# Patient Record
Sex: FEMALE | Race: BLACK OR AFRICAN AMERICAN | NOT HISPANIC OR LATINO | URBAN - METROPOLITAN AREA
[De-identification: names, ages, dates, MRNs, and addresses within clinical notes are randomized per-mention and may not be internally consistent; named-entity substitution may affect disease eponyms.]

---

## 2020-07-25 ENCOUNTER — TELEPHONE ENCOUNTER (OUTPATIENT)
Dept: URBAN - METROPOLITAN AREA CLINIC 13 | Facility: CLINIC | Age: 60
End: 2020-07-25

## 2020-07-25 RX ORDER — POLYETHYLENE GLYCOL 3350, SODIUM CHLORIDE, SODIUM BICARBONATE AND POTASSIUM CHLORIDE WITH LEMON FLAVOR 420; 11.2; 5.72; 1.48 G/4L; G/4L; G/4L; G/4L
TAKE 1/2 GALLON AT 5:00 PM DAY BEFORE PROCEDURE, TAKE SECOND 1/2 OF GALLON 6 HRS PRIOR TO PROCEDURE POWDER, FOR SOLUTION ORAL
Qty: 1 | Refills: 0 | OUTPATIENT
Start: 2018-04-04 | End: 2018-06-08

## 2020-07-26 ENCOUNTER — TELEPHONE ENCOUNTER (OUTPATIENT)
Dept: URBAN - METROPOLITAN AREA CLINIC 13 | Facility: CLINIC | Age: 60
End: 2020-07-26

## 2020-07-26 RX ORDER — QUINAPRIL AND HYDROCHLOROTHIAZIDE 12.5; 1 MG/1; MG/1
TAKE 1 TABLET ONCE DAILY TABLET, FILM COATED ORAL
Refills: 0 | Status: ACTIVE | COMMUNITY
Start: 2018-04-04

## 2020-07-26 RX ORDER — AMLODIPINE BESYLATE 10 MG/1
TAKE 1 TABLET DAILY TABLET ORAL
Refills: 0 | Status: ACTIVE | COMMUNITY
Start: 2018-04-04

## 2020-07-26 RX ORDER — ASPIRIN 81 MG/1
TAKE 1 TABLET DAILY TABLET ORAL
Refills: 0 | Status: ACTIVE | COMMUNITY
Start: 2018-04-04

## 2020-07-26 RX ORDER — INSULIN ASPART 100 [IU]/ML
INJECT 10 UNIT TWICE DAILY INJECTION, SUSPENSION SUBCUTANEOUS
Refills: 0 | Status: ACTIVE | COMMUNITY
Start: 2018-04-04

## 2020-07-26 RX ORDER — METFORMIN HYDROCHLORIDE 1000 MG/1
TAKE 1 TABLET TWICE DAILY WITH MEALS TABLET, COATED ORAL
Refills: 0 | Status: ACTIVE | COMMUNITY
Start: 2018-04-04

## 2020-07-26 RX ORDER — LOSARTAN POTASSIUM 100 MG/1
TAKE 1 TABLET DAILY TABLET, FILM COATED ORAL
Refills: 0 | Status: ACTIVE | COMMUNITY
Start: 2018-04-04

## 2020-07-26 RX ORDER — GLIPIZIDE 10 MG/1
TAKE 1 TABLET DAILY TABLET ORAL
Refills: 0 | Status: ACTIVE | COMMUNITY
Start: 2018-04-04

## 2020-07-26 RX ORDER — HYDROCHLOROTHIAZIDE 25 MG/1
TAKE 1 TABLET DAILY TABLET ORAL
Refills: 0 | Status: ACTIVE | COMMUNITY
Start: 2018-04-04

## 2020-07-26 RX ORDER — CETIRIZINE HYDROCHLORIDE 10 MG/1
TAKE 1 TABLET DAILY AS NEEDED TABLET, FILM COATED ORAL
Refills: 0 | Status: ACTIVE | COMMUNITY
Start: 2018-04-04

## 2020-07-26 RX ORDER — FLUTICASONE PROPIONATE 50 UG/1
USE 1 SPRAY IN EACH NOSTRIL ONCE DAILY SPRAY, METERED NASAL
Refills: 0 | Status: ACTIVE | COMMUNITY
Start: 2018-04-04

## 2020-12-12 ENCOUNTER — TELEPHONE ENCOUNTER (OUTPATIENT)
Dept: URBAN - METROPOLITAN AREA CLINIC 113 | Facility: CLINIC | Age: 60
End: 2020-12-12

## 2020-12-13 ENCOUNTER — CLAIMS CREATED FROM THE CLAIM WINDOW (OUTPATIENT)
Dept: URBAN - METROPOLITAN AREA MEDICAL CENTER 43 | Facility: MEDICAL CENTER | Age: 60
End: 2020-12-13
Payer: COMMERCIAL

## 2020-12-13 DIAGNOSIS — K92.1 MELENA: ICD-10-CM

## 2020-12-13 DIAGNOSIS — D62 ACUTE POSTHEMORRHAGIC ANEMIA: ICD-10-CM

## 2020-12-13 PROCEDURE — 99254 IP/OBS CNSLTJ NEW/EST MOD 60: CPT | Performed by: INTERNAL MEDICINE

## 2020-12-14 ENCOUNTER — CLAIMS CREATED FROM THE CLAIM WINDOW (OUTPATIENT)
Dept: URBAN - METROPOLITAN AREA MEDICAL CENTER 43 | Facility: MEDICAL CENTER | Age: 60
End: 2020-12-14
Payer: COMMERCIAL

## 2020-12-14 DIAGNOSIS — D62 ACUTE POSTHEMORRHAGIC ANEMIA: ICD-10-CM

## 2020-12-14 DIAGNOSIS — K25.9 GASTRIC ULCER, UNSPECIFIED AS ACUTE OR CHRONIC, WITHOUT HEMORRHAGE OR PERFORATION: ICD-10-CM

## 2020-12-14 DIAGNOSIS — K92.1 MELENA: ICD-10-CM

## 2020-12-14 PROCEDURE — 43239 EGD BIOPSY SINGLE/MULTIPLE: CPT | Performed by: INTERNAL MEDICINE

## 2020-12-15 ENCOUNTER — CLAIMS CREATED FROM THE CLAIM WINDOW (OUTPATIENT)
Dept: URBAN - METROPOLITAN AREA MEDICAL CENTER 43 | Facility: MEDICAL CENTER | Age: 60
End: 2020-12-15
Payer: COMMERCIAL

## 2020-12-15 DIAGNOSIS — K92.1 MELENA: ICD-10-CM

## 2020-12-15 DIAGNOSIS — K25.9 GASTRIC ULCER, UNSPECIFIED AS ACUTE OR CHRONIC, WITHOUT HEMORRHAGE OR PERFORATION: ICD-10-CM

## 2020-12-15 DIAGNOSIS — D62 ACUTE POSTHEMORRHAGIC ANEMIA: ICD-10-CM

## 2020-12-15 PROCEDURE — 99232 SBSQ HOSP IP/OBS MODERATE 35: CPT | Performed by: INTERNAL MEDICINE

## 2020-12-18 PROBLEM — 397825006 GASTRIC ULCER: Status: ACTIVE | Noted: 2020-12-18

## 2020-12-21 ENCOUNTER — OFFICE VISIT (OUTPATIENT)
Dept: URBAN - METROPOLITAN AREA CLINIC 113 | Facility: CLINIC | Age: 60
End: 2020-12-21

## 2020-12-21 RX ORDER — FLUTICASONE PROPIONATE 50 UG/1
USE 1 SPRAY IN EACH NOSTRIL ONCE DAILY SPRAY, METERED NASAL
Refills: 0 | Status: ACTIVE | COMMUNITY
Start: 2018-04-04

## 2020-12-21 RX ORDER — QUINAPRIL AND HYDROCHLOROTHIAZIDE 12.5; 1 MG/1; MG/1
TAKE 1 TABLET ONCE DAILY TABLET, FILM COATED ORAL
Refills: 0 | Status: ACTIVE | COMMUNITY
Start: 2018-04-04

## 2020-12-21 RX ORDER — METFORMIN HYDROCHLORIDE 1000 MG/1
TAKE 1 TABLET TWICE DAILY WITH MEALS TABLET, COATED ORAL
Refills: 0 | Status: ACTIVE | COMMUNITY
Start: 2018-04-04

## 2020-12-21 RX ORDER — LOSARTAN POTASSIUM 100 MG/1
TAKE 1 TABLET DAILY TABLET, FILM COATED ORAL
Refills: 0 | Status: ACTIVE | COMMUNITY
Start: 2018-04-04

## 2020-12-21 RX ORDER — CETIRIZINE HYDROCHLORIDE 10 MG/1
TAKE 1 TABLET DAILY AS NEEDED TABLET, FILM COATED ORAL
Refills: 0 | Status: ACTIVE | COMMUNITY
Start: 2018-04-04

## 2020-12-21 RX ORDER — HYDROCHLOROTHIAZIDE 25 MG/1
TAKE 1 TABLET DAILY TABLET ORAL
Refills: 0 | Status: ACTIVE | COMMUNITY
Start: 2018-04-04

## 2020-12-21 RX ORDER — AMLODIPINE BESYLATE 10 MG/1
TAKE 1 TABLET DAILY TABLET ORAL
Refills: 0 | Status: ACTIVE | COMMUNITY
Start: 2018-04-04

## 2020-12-21 RX ORDER — ASPIRIN 81 MG/1
TAKE 1 TABLET DAILY TABLET ORAL
Refills: 0 | Status: ACTIVE | COMMUNITY
Start: 2018-04-04

## 2020-12-21 RX ORDER — INSULIN ASPART 100 [IU]/ML
INJECT 10 UNIT TWICE DAILY INJECTION, SUSPENSION SUBCUTANEOUS
Refills: 0 | Status: ACTIVE | COMMUNITY
Start: 2018-04-04

## 2020-12-21 RX ORDER — GLIPIZIDE 10 MG/1
TAKE 1 TABLET DAILY TABLET ORAL
Refills: 0 | Status: ACTIVE | COMMUNITY
Start: 2018-04-04

## 2020-12-21 NOTE — HPI-TODAY'S VISIT:
Ms. Is a 60-year-old female presenting for follow-up after hospitalization. She was seen on 12/13/2020 at [facility] by Dr. Lua regarding acute blood loss anemia and guaiac positive stools.  EGD was performed on 12/14/20 and was notable for a nonbleeding cratered gastric ulcer in the gastric antrum measuring 9 mm in diameter, chronic gastritis, gastric erosions.  Gastric biopsies negative for H. pylori.

## 2021-01-06 ENCOUNTER — TELEPHONE ENCOUNTER (OUTPATIENT)
Dept: URBAN - METROPOLITAN AREA CLINIC 113 | Facility: CLINIC | Age: 61
End: 2021-01-06

## 2021-01-14 ENCOUNTER — LAB OUTSIDE AN ENCOUNTER (OUTPATIENT)
Dept: URBAN - METROPOLITAN AREA CLINIC 113 | Facility: CLINIC | Age: 61
End: 2021-01-14

## 2021-01-14 ENCOUNTER — OFFICE VISIT (OUTPATIENT)
Dept: URBAN - METROPOLITAN AREA CLINIC 113 | Facility: CLINIC | Age: 61
End: 2021-01-14
Payer: COMMERCIAL

## 2021-01-14 VITALS
WEIGHT: 175 LBS | SYSTOLIC BLOOD PRESSURE: 158 MMHG | HEART RATE: 84 BPM | TEMPERATURE: 97.1 F | HEIGHT: 62 IN | DIASTOLIC BLOOD PRESSURE: 81 MMHG | BODY MASS INDEX: 32.2 KG/M2

## 2021-01-14 DIAGNOSIS — K92.1 MELENA: ICD-10-CM

## 2021-01-14 DIAGNOSIS — D62 ACUTE POSTHEMORRHAGIC ANEMIA: ICD-10-CM

## 2021-01-14 DIAGNOSIS — K25.3 GASTRIC ULCER: ICD-10-CM

## 2021-01-14 DIAGNOSIS — K25.9 GASTRIC ULCER, UNSPECIFIED AS ACUTE OR CHRONIC, WITHOUT HEMORRHAGE OR PERFORATION: ICD-10-CM

## 2021-01-14 PROCEDURE — G8427 DOCREV CUR MEDS BY ELIG CLIN: HCPCS | Performed by: NURSE PRACTITIONER

## 2021-01-14 PROCEDURE — 99214 OFFICE O/P EST MOD 30 MIN: CPT | Performed by: NURSE PRACTITIONER

## 2021-01-14 RX ORDER — INSULIN ASPART 100 [IU]/ML
INJECT 10 UNIT TWICE DAILY INJECTION, SUSPENSION SUBCUTANEOUS
Refills: 0 | Status: ACTIVE | COMMUNITY
Start: 2018-04-04

## 2021-01-14 RX ORDER — HYDROCHLOROTHIAZIDE 25 MG/1
TAKE 1 TABLET DAILY TABLET ORAL
Refills: 0 | Status: ACTIVE | COMMUNITY
Start: 2018-04-04

## 2021-01-14 RX ORDER — GLIPIZIDE 10 MG/1
TAKE 1 TABLET DAILY TABLET ORAL
Refills: 0 | Status: ON HOLD | COMMUNITY
Start: 2018-04-04

## 2021-01-14 RX ORDER — CETIRIZINE HYDROCHLORIDE 10 MG/1
TAKE 1 TABLET DAILY AS NEEDED TABLET, FILM COATED ORAL
Refills: 0 | Status: ON HOLD | COMMUNITY
Start: 2018-04-04

## 2021-01-14 RX ORDER — LOSARTAN POTASSIUM 100 MG/1
TAKE 1 TABLET DAILY TABLET, FILM COATED ORAL
Refills: 0 | Status: ON HOLD | COMMUNITY
Start: 2018-04-04

## 2021-01-14 RX ORDER — ASPIRIN 81 MG/1
TAKE 1 TABLET DAILY TABLET ORAL
Refills: 0 | Status: ACTIVE | COMMUNITY
Start: 2018-04-04

## 2021-01-14 RX ORDER — VITAMIN A 2400 MCG
1 TABLET CAPSULE ORAL ONCE A DAY
Qty: 30 | OUTPATIENT
Start: 2021-01-14

## 2021-01-14 RX ORDER — AMLODIPINE BESYLATE 10 MG/1
TAKE 1 TABLET DAILY TABLET ORAL
Refills: 0 | Status: ACTIVE | COMMUNITY
Start: 2018-04-04

## 2021-01-14 RX ORDER — FLUTICASONE PROPIONATE 50 UG/1
USE 1 SPRAY IN EACH NOSTRIL ONCE DAILY SPRAY, METERED NASAL
Refills: 0 | Status: ACTIVE | COMMUNITY
Start: 2018-04-04

## 2021-01-14 RX ORDER — PANTOPRAZOLE SODIUM 40 MG/1
1 TABLET TABLET, DELAYED RELEASE ORAL TWICE DAILY
Qty: 180 | Refills: 4 | OUTPATIENT
Start: 2021-01-14

## 2021-01-14 RX ORDER — LOSARTAN POTASSIUM 50 MG/1
1 TABLET TABLET ORAL ONCE A DAY
Status: ACTIVE | COMMUNITY

## 2021-01-14 RX ORDER — QUINAPRIL AND HYDROCHLOROTHIAZIDE 12.5; 1 MG/1; MG/1
TAKE 1 TABLET ONCE DAILY TABLET, FILM COATED ORAL
Refills: 0 | Status: ON HOLD | COMMUNITY
Start: 2018-04-04

## 2021-01-14 RX ORDER — METFORMIN HYDROCHLORIDE 1000 MG/1
TAKE 1 TABLET TWICE DAILY WITH MEALS TABLET, COATED ORAL
Refills: 0 | Status: ACTIVE | COMMUNITY
Start: 2018-04-04

## 2021-01-14 NOTE — HPI-TODAY'S VISIT:
60-year-old woman, patient of Dr. Art, presenting for follow-up after hospitalization for upper GI bleeding, likely due to nonsteroidal induced injury.  She was seen in consultation by Dr. Adalberto Lua on 12/13/2020.  Hemoglobin on admission was 5.5, hematocrit 17.5, platelets 333.  She was transfused 2 units of  packed red blood cells with hemoglobin increasing to 8.8  Following transfusion.  Her baseline hemoglobin is approximately around 9.  An EGD was performed 12/14/2020.  Findings included a normal esophagus, nonbleeding gastric ulcer without stigmata of bleeding, chronic gastritis status post biopsies, gastric erosions without bleeding, normal duodenum.  Pathology demonstrated no H. pylori.  She was discharged on  12/15/2020 on pantoprazole 40 mg twice daily, ferrous sulfate 324 mg  twice daily.  Repeat EGD is recommended in 8 weeks to ensure ulcer healing.  She called the office stating that she was experiencing diarrhea. She was instructed to stop Cipro, and planned for stools for C. diff. This was negative. She has been taking PPI BID. No NSAIDs.  Patient is without any abdominal complaints today. No dysphagia, heartburn, regurgitation, unintentional weight loss, nausea, vomiting, hematemesis or melena. Bowels are moving regularly without blood per rectum. No complaints of bloating. No jaundice, icterus.

## 2021-02-09 ENCOUNTER — TELEPHONE ENCOUNTER (OUTPATIENT)
Dept: URBAN - METROPOLITAN AREA CLINIC 113 | Facility: CLINIC | Age: 61
End: 2021-02-09

## 2021-02-09 RX ORDER — RABEPRAZOLE SODIUM 20 MG/1
1 TABLET TABLET, DELAYED RELEASE ORAL ONCE A DAY
Qty: 30 TABLET | Refills: 6 | OUTPATIENT
Start: 2021-02-09

## 2021-02-19 ENCOUNTER — OFFICE VISIT (OUTPATIENT)
Dept: URBAN - METROPOLITAN AREA SURGERY CENTER 25 | Facility: SURGERY CENTER | Age: 61
End: 2021-02-19

## 2021-04-19 ENCOUNTER — OFFICE VISIT (OUTPATIENT)
Dept: URBAN - METROPOLITAN AREA CLINIC 113 | Facility: CLINIC | Age: 61
End: 2021-04-19

## 2021-10-18 ENCOUNTER — ERX REFILL RESPONSE (OUTPATIENT)
Dept: URBAN - METROPOLITAN AREA CLINIC 113 | Facility: CLINIC | Age: 61
End: 2021-10-18

## 2021-10-18 RX ORDER — FERROUS SULFATE TAB 325 MG (65 MG ELEMENTAL FE) 325 (65 FE) MG
TAKE ONE TABLET BY MOUTH DAILY TAB ORAL
Qty: 30 TABLET | Refills: 1 | OUTPATIENT

## 2021-10-18 RX ORDER — FERROUS SULFATE TAB 325 MG (65 MG ELEMENTAL FE) 325 (65 FE) MG
TAKE ONE TABLET BY MOUTH DAILY TAB ORAL
Qty: 30 TABLET | Refills: 0 | OUTPATIENT

## 2023-04-05 ENCOUNTER — OFFICE VISIT (OUTPATIENT)
Dept: URBAN - METROPOLITAN AREA CLINIC 113 | Facility: CLINIC | Age: 63
End: 2023-04-05
Payer: COMMERCIAL

## 2023-04-05 VITALS
HEART RATE: 69 BPM | RESPIRATION RATE: 18 BRPM | TEMPERATURE: 97.3 F | BODY MASS INDEX: 30.59 KG/M2 | HEIGHT: 62 IN | DIASTOLIC BLOOD PRESSURE: 60 MMHG | WEIGHT: 166.2 LBS | SYSTOLIC BLOOD PRESSURE: 126 MMHG

## 2023-04-05 DIAGNOSIS — K25.9 GASTRIC ULCER, UNSPECIFIED AS ACUTE OR CHRONIC, WITHOUT HEMORRHAGE OR PERFORATION: ICD-10-CM

## 2023-04-05 DIAGNOSIS — D62 ACUTE POSTHEMORRHAGIC ANEMIA: ICD-10-CM

## 2023-04-05 DIAGNOSIS — K58.0 IRRITABLE BOWEL SYNDROME WITH DIARRHEA: ICD-10-CM

## 2023-04-05 DIAGNOSIS — K92.1 MELENA: ICD-10-CM

## 2023-04-05 PROBLEM — 197125005: Status: ACTIVE | Noted: 2023-04-05

## 2023-04-05 PROCEDURE — 99244 OFF/OP CNSLTJ NEW/EST MOD 40: CPT | Performed by: INTERNAL MEDICINE

## 2023-04-05 PROCEDURE — 99204 OFFICE O/P NEW MOD 45 MIN: CPT | Performed by: INTERNAL MEDICINE

## 2023-04-05 RX ORDER — HYDROCHLOROTHIAZIDE 25 MG/1
TAKE 1 TABLET DAILY TABLET ORAL
Refills: 0 | Status: ACTIVE | COMMUNITY
Start: 2018-04-04

## 2023-04-05 RX ORDER — RIFAXIMIN 550 MG/1
1 TABLET TABLET ORAL THREE TIMES A DAY
Qty: 42 TABLET | Refills: 0 | OUTPATIENT
Start: 2023-04-05 | End: 2023-04-19

## 2023-04-05 RX ORDER — RABEPRAZOLE SODIUM 20 MG/1
1 TABLET TABLET, DELAYED RELEASE ORAL ONCE A DAY
Qty: 30 TABLET | Refills: 6 | Status: ACTIVE | COMMUNITY
Start: 2021-02-09

## 2023-04-05 RX ORDER — FLUTICASONE PROPIONATE 50 UG/1
USE 1 SPRAY IN EACH NOSTRIL ONCE DAILY SPRAY, METERED NASAL
Refills: 0 | Status: ACTIVE | COMMUNITY
Start: 2018-04-04

## 2023-04-05 RX ORDER — ASPIRIN 81 MG/1
TAKE 1 TABLET DAILY TABLET ORAL
Refills: 0 | Status: ON HOLD | COMMUNITY
Start: 2018-04-04

## 2023-04-05 RX ORDER — RABEPRAZOLE SODIUM 20 MG/1
1 TABLET TABLET, DELAYED RELEASE ORAL ONCE A DAY
Qty: 90 | Refills: 3 | OUTPATIENT
Start: 2023-04-05

## 2023-04-05 RX ORDER — METFORMIN HYDROCHLORIDE 1000 MG/1
TAKE 1 TABLET TWICE DAILY WITH MEALS TABLET, COATED ORAL
Refills: 0 | Status: ON HOLD | COMMUNITY
Start: 2018-04-04

## 2023-04-05 RX ORDER — CETIRIZINE HYDROCHLORIDE 10 MG/1
TAKE 1 TABLET DAILY AS NEEDED TABLET, FILM COATED ORAL
Refills: 0 | Status: ON HOLD | COMMUNITY
Start: 2018-04-04

## 2023-04-05 RX ORDER — INSULIN ASPART 100 [IU]/ML
INJECT 10 UNIT TWICE DAILY INJECTION, SUSPENSION SUBCUTANEOUS
Refills: 0 | Status: ACTIVE | COMMUNITY
Start: 2018-04-04

## 2023-04-05 RX ORDER — PANTOPRAZOLE SODIUM 40 MG/1
1 TABLET TABLET, DELAYED RELEASE ORAL TWICE DAILY
Qty: 180 | Refills: 4 | Status: ON HOLD | COMMUNITY
Start: 2021-01-14

## 2023-04-05 RX ORDER — QUINAPRIL AND HYDROCHLOROTHIAZIDE 12.5; 1 MG/1; MG/1
TAKE 1 TABLET ONCE DAILY TABLET, FILM COATED ORAL
Refills: 0 | Status: ON HOLD | COMMUNITY
Start: 2018-04-04

## 2023-04-05 RX ORDER — AMLODIPINE BESYLATE 10 MG/1
TAKE 1 TABLET DAILY TABLET ORAL
Refills: 0 | Status: ACTIVE | COMMUNITY
Start: 2018-04-04

## 2023-04-05 RX ORDER — LOSARTAN POTASSIUM 50 MG/1
1 TABLET TABLET ORAL ONCE A DAY
Status: ACTIVE | COMMUNITY

## 2023-04-05 RX ORDER — LOSARTAN POTASSIUM 100 MG/1
TAKE 1 TABLET DAILY TABLET, FILM COATED ORAL
Refills: 0 | Status: ON HOLD | COMMUNITY
Start: 2018-04-04

## 2023-04-05 RX ORDER — VITAMIN A 2400 MCG
1 TABLET CAPSULE ORAL ONCE A DAY
Qty: 30 | Status: ACTIVE | COMMUNITY
Start: 2021-01-14

## 2023-04-05 RX ORDER — GLIPIZIDE 10 MG/1
TAKE 1 TABLET DAILY TABLET ORAL
Refills: 0 | Status: ON HOLD | COMMUNITY
Start: 2018-04-04

## 2023-04-05 NOTE — PHYSICAL EXAM GASTROINTESTINAL
soft, nontender, nondistended , normal bowel sounds, midline bulge noted when patient sits up straight from supine position

## 2023-04-05 NOTE — HPI-TODAY'S VISIT:
60-year-old woman, patient of Dr. Art, presenting for follow-up after hospitalization for upper GI bleeding, likely due to nonsteroidal induced injury.  She was seen in consultation by Dr. Adalberto Lua on 12/13/2020.  Hemoglobin on admission was 5.5, hematocrit 17.5, platelets 333.  She was transfused 2 units of  packed red blood cells with hemoglobin increasing to 8.8  Following transfusion.  Her baseline hemoglobin is approximately around 9.  An EGD was performed 12/14/2020.  Findings included a normal esophagus, nonbleeding gastric ulcer without stigmata of bleeding, chronic gastritis status post biopsies, gastric erosions without bleeding, normal duodenum.  Pathology demonstrated no H. pylori.  She was discharged on  12/15/2020 on pantoprazole 40 mg twice daily, ferrous sulfate 324 mg  twice daily.  Repeat EGD is recommended in 8 weeks to ensure ulcer healing.  She called the office stating that she was experiencing diarrhea. She was instructed to stop Cipro, and planned for stools for C. diff. This was negative. She has been taking PPI BID. No NSAIDs.  Patient is without any abdominal complaints today. No dysphagia, heartburn, regurgitation, unintentional weight loss, nausea, vomiting, hematemesis or melena. Bowels are moving regularly without blood per rectum. No complaints of bloating. No jaundice, icterus.  Interval history, 4/5/2023 62-year-old female with a history of COPD, chronic kidney disease stage IV, type 2 diabetes, hypertension, GERD presents for long interval follow-up.  She was last seen on 1/14/2021.  She was recently hospitalized for melena and acute blood loss anemia secondary to gastric ulcer.  She was treated with 2 units packed PRBC.  She was to continue twice daily PPI therapy with plans for repeat EGD to assess for ulcer healing.  She was to continue NSAID avoidance, and we would repeat hemoglobin to trend. Pantoprazole caused severe diarrhea.  This was switched to rabeprazole.  EGD scheduled for 2/19/2021 was canceled due to COVID.  She has been lost to follow-up ever since. She has been referred back today by Dr. Hipolito Hernandez for irritable bowel syndrome with diarrhea.  A copy of today's visit will be forwarded to the referring provider. Per the referral note, she is currently using Lomotil to manage her diarrhea. Labs 11/18/2022:Hemoglobin A1c 13.1, cholesterol 200, , glucose 330, BUN 31, creatinine 1.81, GFR 32, alkaline phosphatase 125 otherwise normal LFTs, hemoglobin 11.4, hematocrit 33, normal MCV. Colonoscopy 6/8/2018:Performed with difficulty due to restricted mobility of the colon, significant looping and a tortuous colon.  Quality of bowel prep was good.  TI was normal.  Diverticulosis in the sigmoid colon.  No specimens collected.  Repeat colonoscopy in 10 years for screening purposes.  Due in June 2028.  She has had intermittent diarrhea ongoing for years. She states this flared after having Covid in 2021 then again in 2023. She was requiring adult diapers due to incontinence. She has had fairly normal bowel movements for the last 2-4 weeks. She is concerned for future flares. SHe has Lomotil pending exacerbations. She has had marked abdominal bloating for some time now. Denies abdominal pain.   She is no longer on Metformin. She is no longer taking Pantoprazole. She is currently managing her reflux with Rabeprazole. She has run out of the medication. She has occasional nausea without vomiting. Denies melena or hematemesis.

## 2023-05-17 ENCOUNTER — OFFICE VISIT (OUTPATIENT)
Dept: URBAN - METROPOLITAN AREA CLINIC 113 | Facility: CLINIC | Age: 63
End: 2023-05-17
Payer: COMMERCIAL

## 2023-05-17 ENCOUNTER — WEB ENCOUNTER (OUTPATIENT)
Dept: URBAN - METROPOLITAN AREA CLINIC 113 | Facility: CLINIC | Age: 63
End: 2023-05-17

## 2023-05-17 VITALS
HEART RATE: 75 BPM | BODY MASS INDEX: 31.36 KG/M2 | SYSTOLIC BLOOD PRESSURE: 103 MMHG | DIASTOLIC BLOOD PRESSURE: 64 MMHG | HEIGHT: 62 IN | WEIGHT: 170.4 LBS | RESPIRATION RATE: 18 BRPM | TEMPERATURE: 97.3 F

## 2023-05-17 DIAGNOSIS — K25.7 CHRONIC GASTRIC ULCER: ICD-10-CM

## 2023-05-17 DIAGNOSIS — R19.7 CHRONIC DIARRHEA: ICD-10-CM

## 2023-05-17 DIAGNOSIS — R14.0 ABDOMINAL BLOATING: ICD-10-CM

## 2023-05-17 DIAGNOSIS — D64.89 OTHER SPECIFIED ANEMIAS: ICD-10-CM

## 2023-05-17 PROCEDURE — 99214 OFFICE O/P EST MOD 30 MIN: CPT | Performed by: INTERNAL MEDICINE

## 2023-05-17 RX ORDER — FLUTICASONE PROPIONATE 50 UG/1
USE 1 SPRAY IN EACH NOSTRIL ONCE DAILY SPRAY, METERED NASAL
Refills: 0 | Status: ACTIVE | COMMUNITY
Start: 2018-04-04

## 2023-05-17 RX ORDER — CETIRIZINE HYDROCHLORIDE 10 MG/1
TAKE 1 TABLET DAILY AS NEEDED TABLET, FILM COATED ORAL
Refills: 0 | Status: ON HOLD | COMMUNITY
Start: 2018-04-04

## 2023-05-17 RX ORDER — INSULIN ASPART 100 [IU]/ML
INJECT 10 UNIT TWICE DAILY INJECTION, SUSPENSION SUBCUTANEOUS
Refills: 0 | Status: ACTIVE | COMMUNITY
Start: 2018-04-04

## 2023-05-17 RX ORDER — RABEPRAZOLE SODIUM 20 MG/1
1 TABLET TABLET, DELAYED RELEASE ORAL ONCE A DAY
Qty: 90 | Refills: 3 | Status: ACTIVE | COMMUNITY
Start: 2023-04-05

## 2023-05-17 RX ORDER — GLIPIZIDE 10 MG/1
TAKE 1 TABLET DAILY TABLET ORAL
Refills: 0 | Status: ON HOLD | COMMUNITY
Start: 2018-04-04

## 2023-05-17 RX ORDER — ASPIRIN 81 MG/1
TAKE 1 TABLET DAILY TABLET ORAL
Refills: 0 | Status: ON HOLD | COMMUNITY
Start: 2018-04-04

## 2023-05-17 RX ORDER — LOSARTAN POTASSIUM 100 MG/1
TAKE 1 TABLET DAILY TABLET, FILM COATED ORAL
Refills: 0 | Status: ON HOLD | COMMUNITY
Start: 2018-04-04

## 2023-05-17 RX ORDER — HYDROCHLOROTHIAZIDE 25 MG/1
TAKE 1 TABLET DAILY TABLET ORAL
Refills: 0 | Status: ACTIVE | COMMUNITY
Start: 2018-04-04

## 2023-05-17 RX ORDER — RABEPRAZOLE SODIUM 20 MG/1
1 TABLET TABLET, DELAYED RELEASE ORAL ONCE A DAY
Qty: 30 TABLET | Refills: 6 | Status: ACTIVE | COMMUNITY
Start: 2021-02-09

## 2023-05-17 RX ORDER — METFORMIN HYDROCHLORIDE 1000 MG/1
TAKE 1 TABLET TWICE DAILY WITH MEALS TABLET, COATED ORAL
Refills: 0 | Status: ON HOLD | COMMUNITY
Start: 2018-04-04

## 2023-05-17 RX ORDER — VITAMIN A 2400 MCG
1 TABLET CAPSULE ORAL ONCE A DAY
Qty: 30 | Status: ACTIVE | COMMUNITY
Start: 2021-01-14

## 2023-05-17 RX ORDER — QUINAPRIL AND HYDROCHLOROTHIAZIDE 12.5; 1 MG/1; MG/1
TAKE 1 TABLET ONCE DAILY TABLET, FILM COATED ORAL
Refills: 0 | Status: ON HOLD | COMMUNITY
Start: 2018-04-04

## 2023-05-17 RX ORDER — LOSARTAN POTASSIUM 50 MG/1
1 TABLET TABLET ORAL ONCE A DAY
Status: ACTIVE | COMMUNITY

## 2023-05-17 RX ORDER — PANTOPRAZOLE SODIUM 40 MG/1
1 TABLET TABLET, DELAYED RELEASE ORAL TWICE DAILY
Qty: 180 | Refills: 4 | Status: ON HOLD | COMMUNITY
Start: 2021-01-14

## 2023-05-17 RX ORDER — RABEPRAZOLE SODIUM 20 MG/1
1 TABLET TABLET, DELAYED RELEASE ORAL ONCE A DAY
Qty: 90 | Refills: 3 | OUTPATIENT

## 2023-05-17 RX ORDER — AMLODIPINE BESYLATE 10 MG/1
TAKE 1 TABLET DAILY TABLET ORAL
Refills: 0 | Status: ACTIVE | COMMUNITY
Start: 2018-04-04

## 2023-05-17 NOTE — HPI-TODAY'S VISIT:
60-year-old woman, patient of Dr. Art, presenting for follow-up after hospitalization for upper GI bleeding, likely due to nonsteroidal induced injury.  She was seen in consultation by Dr. Adalberto Lua on 12/13/2020.  Hemoglobin on admission was 5.5, hematocrit 17.5, platelets 333.  She was transfused 2 units of  packed red blood cells with hemoglobin increasing to 8.8  Following transfusion.  Her baseline hemoglobin is approximately around 9.  An EGD was performed 12/14/2020.  Findings included a normal esophagus, nonbleeding gastric ulcer without stigmata of bleeding, chronic gastritis status post biopsies, gastric erosions without bleeding, normal duodenum.  Pathology demonstrated no H. pylori.  She was discharged on  12/15/2020 on pantoprazole 40 mg twice daily, ferrous sulfate 324 mg  twice daily.  Repeat EGD is recommended in 8 weeks to ensure ulcer healing.  She called the office stating that she was experiencing diarrhea. She was instructed to stop Cipro, and planned for stools for C. diff. This was negative. She has been taking PPI BID. No NSAIDs.  Patient is without any abdominal complaints today. No dysphagia, heartburn, regurgitation, unintentional weight loss, nausea, vomiting, hematemesis or melena. Bowels are moving regularly without blood per rectum. No complaints of bloating. No jaundice, icterus.  Interval history, 4/5/2023 62-year-old female with a history of COPD, chronic kidney disease stage IV, type 2 diabetes, hypertension, GERD presents for long interval follow-up.  She was last seen on 1/14/2021.  She was recently hospitalized for melena and acute blood loss anemia secondary to gastric ulcer.  She was treated with 2 units packed PRBC.  She was to continue twice daily PPI therapy with plans for repeat EGD to assess for ulcer healing.  She was to continue NSAID avoidance, and we would repeat hemoglobin to trend. Pantoprazole caused severe diarrhea.  This was switched to rabeprazole.  EGD scheduled for 2/19/2021 was canceled due to COVID.  She has been lost to follow-up ever since. She has been referred back today by Dr. Hipolito Hernandez for irritable bowel syndrome with diarrhea.  A copy of today's visit will be forwarded to the referring provider. Per the referral note, she is currently using Lomotil to manage her diarrhea. Labs 11/18/2022:Hemoglobin A1c 13.1, cholesterol 200, , glucose 330, BUN 31, creatinine 1.81, GFR 32, alkaline phosphatase 125 otherwise normal LFTs, hemoglobin 11.4, hematocrit 33, normal MCV. Colonoscopy 6/8/2018:Performed with difficulty due to restricted mobility of the colon, significant looping and a tortuous colon.  Quality of bowel prep was good.  TI was normal.  Diverticulosis in the sigmoid colon.  No specimens collected.  Repeat colonoscopy in 10 years for screening purposes.  Due in June 2028.  She has had intermittent diarrhea ongoing for years. She states this flared after having Covid in 2021 then again in 2023. She was requiring adult diapers due to incontinence. She has had fairly normal bowel movements for the last 2-4 weeks. She is concerned for future flares. SHe has Lomotil pending exacerbations. She has had marked abdominal bloating for some time now. Denies abdominal pain.   She is no longer on Metformin. She is no longer taking Pantoprazole. She is currently managing her reflux with Rabeprazole. She has run out of the medication. She has occasional nausea without vomiting. Denies melena or hematemesis.  Interval history, 5/16/2023: 62 year old female presents for follow up. She was last seen on 4/5/2023. She was trialed on course of Xifaxan for possible IBS. If no improvement, we woul consider a colonoscopy. Due to abdominal bloating, an ultrasound was planned.   Abdominal ultrasound4/17/2023: No evidence of midline anterior abdominal wall hernia or other abnormalities.  Labs 5/10/2023: Hgb 8.2, Hct 24.7, normal MCVglucose 296.7, BUN 30, creatinine 1.79, normal LFTs, GFR 32.  Patient was brought to EMS on 5/9/2023 for hyperglycemia. Hgb A1c was 13.4. Chest x-ray reveled patchy bilateral airspace opacities most pronounced within the lower loves can be seen with pulmonary edema or multifocal  infectious.inflammatory process. Kidney ultrasound revealed no evidence for obstructive uropathy. There was debris within the urniary bladder. This is nonspecific but can be seen with infection. Patient was admitted for sepsis (?)  She states her bloating has improved. She does still have intermittent diarrhea. SHe did not finish her Xifaxan prior to be hospitalized. She also did not trial the benefiber. She states her sugars are under better control. She has changed her diet. She does admit to more cold intolerance as of late. SHe has not had her hgb checked. Denies blood per rectum.

## 2023-05-18 LAB
ABSOLUTE BASOPHILS: 19
ABSOLUTE EOSINOPHILS: 151
ABSOLUTE LYMPHOCYTES: 1972
ABSOLUTE MONOCYTES: 359
ABSOLUTE NEUTROPHILS: 3799
BASOPHILS: 0.3
EOSINOPHILS: 2.4
FERRITIN, SERUM: 213
HEMATOCRIT: 28.6
HEMOGLOBIN: 9.2
IRON BIND.CAP.(TIBC): 229
IRON SATURATION: 30
IRON: 68
LYMPHOCYTES: 31.3
MCH: 28
MCHC: 32.2
MCV: 86.9
MONOCYTES: 5.7
MPV: 9.3
NEUTROPHILS: 60.3
PLATELET COUNT: 355
RDW: 12.4
RED BLOOD CELL COUNT: 3.29
WHITE BLOOD CELL COUNT: 6.3

## 2023-06-14 ENCOUNTER — OFFICE VISIT (OUTPATIENT)
Dept: URBAN - METROPOLITAN AREA CLINIC 113 | Facility: CLINIC | Age: 63
End: 2023-06-14

## 2023-07-17 ENCOUNTER — OFFICE VISIT (OUTPATIENT)
Dept: URBAN - METROPOLITAN AREA CLINIC 113 | Facility: CLINIC | Age: 63
End: 2023-07-17
Payer: COMMERCIAL

## 2023-07-17 VITALS
HEIGHT: 62 IN | WEIGHT: 158.4 LBS | SYSTOLIC BLOOD PRESSURE: 110 MMHG | TEMPERATURE: 97.5 F | DIASTOLIC BLOOD PRESSURE: 55 MMHG | RESPIRATION RATE: 18 BRPM | BODY MASS INDEX: 29.15 KG/M2 | HEART RATE: 75 BPM

## 2023-07-17 DIAGNOSIS — K58.0 IRRITABLE BOWEL SYNDROME WITH DIARRHEA: ICD-10-CM

## 2023-07-17 DIAGNOSIS — K25.9 ANTRAL ULCER: ICD-10-CM

## 2023-07-17 DIAGNOSIS — R14.0 ABDOMINAL BLOATING: ICD-10-CM

## 2023-07-17 DIAGNOSIS — D62 ACUTE POSTHEMORRHAGIC ANEMIA: ICD-10-CM

## 2023-07-17 PROCEDURE — 99214 OFFICE O/P EST MOD 30 MIN: CPT | Performed by: INTERNAL MEDICINE

## 2023-07-17 RX ORDER — QUINAPRIL AND HYDROCHLOROTHIAZIDE 12.5; 1 MG/1; MG/1
TAKE 1 TABLET ONCE DAILY TABLET, FILM COATED ORAL
Refills: 0 | Status: ON HOLD | COMMUNITY
Start: 2018-04-04

## 2023-07-17 RX ORDER — RABEPRAZOLE SODIUM 20 MG/1
1 TABLET TABLET, DELAYED RELEASE ORAL ONCE A DAY
Qty: 90 | Refills: 3 | Status: ACTIVE | COMMUNITY

## 2023-07-17 RX ORDER — INSULIN ASPART 100 [IU]/ML
INJECT 10 UNIT TWICE DAILY INJECTION, SUSPENSION SUBCUTANEOUS
Refills: 0 | Status: ON HOLD | COMMUNITY
Start: 2018-04-04

## 2023-07-17 RX ORDER — PANTOPRAZOLE SODIUM 40 MG/1
1 TABLET TABLET, DELAYED RELEASE ORAL TWICE DAILY
Qty: 180 | Refills: 4 | Status: ON HOLD | COMMUNITY
Start: 2021-01-14

## 2023-07-17 RX ORDER — METFORMIN HYDROCHLORIDE 1000 MG/1
TAKE 1 TABLET TWICE DAILY WITH MEALS TABLET, COATED ORAL
Refills: 0 | Status: ON HOLD | COMMUNITY
Start: 2018-04-04

## 2023-07-17 RX ORDER — RABEPRAZOLE SODIUM 20 MG/1
1 TABLET TABLET, DELAYED RELEASE ORAL ONCE A DAY
Qty: 30 TABLET | Refills: 6 | Status: ACTIVE | COMMUNITY
Start: 2021-02-09

## 2023-07-17 RX ORDER — VITAMIN A 2400 MCG
1 TABLET CAPSULE ORAL ONCE A DAY
Qty: 30 | Status: ACTIVE | COMMUNITY
Start: 2021-01-14

## 2023-07-17 RX ORDER — FUROSEMIDE 40 MG/1
1 TABLET TABLET ORAL ONCE A DAY
Status: ACTIVE | COMMUNITY

## 2023-07-17 RX ORDER — FLUTICASONE PROPIONATE 50 UG/1
USE 1 SPRAY IN EACH NOSTRIL ONCE DAILY SPRAY, METERED NASAL
Refills: 0 | Status: ACTIVE | COMMUNITY
Start: 2018-04-04

## 2023-07-17 RX ORDER — LOSARTAN POTASSIUM 100 MG/1
TAKE 1 TABLET DAILY TABLET, FILM COATED ORAL
Refills: 0 | Status: ON HOLD | COMMUNITY
Start: 2018-04-04

## 2023-07-17 RX ORDER — FAMOTIDINE 40 MG/1
1 TABLET AT BEDTIME TABLET, FILM COATED ORAL ONCE A DAY
Status: ACTIVE | COMMUNITY

## 2023-07-17 RX ORDER — ASPIRIN 81 MG/1
TAKE 1 TABLET DAILY TABLET ORAL
Refills: 0 | Status: ON HOLD | COMMUNITY
Start: 2018-04-04

## 2023-07-17 RX ORDER — LOSARTAN POTASSIUM 100 MG/1
1 TABLET TABLET ORAL ONCE A DAY
Status: ACTIVE | COMMUNITY

## 2023-07-17 RX ORDER — AMLODIPINE BESYLATE 10 MG/1
TAKE 1 TABLET DAILY TABLET ORAL
Refills: 0 | Status: ACTIVE | COMMUNITY
Start: 2018-04-04

## 2023-07-17 RX ORDER — MONTELUKAST 10 MG/1
1 TABLET TABLET, FILM COATED ORAL ONCE A DAY
Status: ACTIVE | COMMUNITY

## 2023-07-17 RX ORDER — PROMETHAZINE HYDROCHLORIDE 25 MG/1
1 TABLET AS NEEDED TABLET ORAL
Status: ACTIVE | COMMUNITY

## 2023-07-17 RX ORDER — CETIRIZINE HYDROCHLORIDE 10 MG/1
TAKE 1 TABLET DAILY AS NEEDED TABLET, FILM COATED ORAL
Refills: 0 | Status: ON HOLD | COMMUNITY
Start: 2018-04-04

## 2023-07-17 RX ORDER — ATORVASTATIN CALCIUM 20 MG/1
1 TABLET TABLET, FILM COATED ORAL ONCE A DAY
Status: ACTIVE | COMMUNITY

## 2023-07-17 RX ORDER — HYDROCHLOROTHIAZIDE 25 MG/1
TAKE 1 TABLET DAILY TABLET ORAL
Refills: 0 | Status: ACTIVE | COMMUNITY
Start: 2018-04-04

## 2023-07-17 RX ORDER — GLIPIZIDE 10 MG/1
TAKE 1 TABLET DAILY TABLET ORAL
Refills: 0 | Status: ON HOLD | COMMUNITY
Start: 2018-04-04

## 2023-07-17 NOTE — EXAM-PHYSICAL EXAM
She is alert and oriented to person place and situation no acute distress.  There is no scleral icterus.  She is moderately pale in appearance.

## 2023-07-17 NOTE — HPI-TODAY'S VISIT:
60-year-old woman, patient of Dr. Art, presenting for follow-up after hospitalization for upper GI bleeding, likely due to nonsteroidal induced injury.  She was seen in consultation by Dr. Adalberto Lua on 12/13/2020.  Hemoglobin on admission was 5.5, hematocrit 17.5, platelets 333.  She was transfused 2 units of  packed red blood cells with hemoglobin increasing to 8.8  Following transfusion.  Her baseline hemoglobin is approximately around 9.  An EGD was performed 12/14/2020.  Findings included a normal esophagus, nonbleeding gastric ulcer without stigmata of bleeding, chronic gastritis status post biopsies, gastric erosions without bleeding, normal duodenum.  Pathology demonstrated no H. pylori.  She was discharged on  12/15/2020 on pantoprazole 40 mg twice daily, ferrous sulfate 324 mg  twice daily.  Repeat EGD is recommended in 8 weeks to ensure ulcer healing.  She called the office stating that she was experiencing diarrhea. She was instructed to stop Cipro, and planned for stools for C. diff. This was negative. She has been taking PPI BID. No NSAIDs.  Patient is without any abdominal complaints today. No dysphagia, heartburn, regurgitation, unintentional weight loss, nausea, vomiting, hematemesis or melena. Bowels are moving regularly without blood per rectum. No complaints of bloating. No jaundice, icterus.  Interval history, 4/5/2023 62-year-old female with a history of COPD, chronic kidney disease stage IV, type 2 diabetes, hypertension, GERD presents for long interval follow-up.  She was last seen on 1/14/2021.  She was recently hospitalized for melena and acute blood loss anemia secondary to gastric ulcer.  She was treated with 2 units packed PRBC.  She was to continue twice daily PPI therapy with plans for repeat EGD to assess for ulcer healing.  She was to continue NSAID avoidance, and we would repeat hemoglobin to trend. Pantoprazole caused severe diarrhea.  This was switched to rabeprazole.  EGD scheduled for 2/19/2021 was canceled due to COVID.  She has been lost to follow-up ever since. She has been referred back today by Dr. Hipolito Hernandez for irritable bowel syndrome with diarrhea.  A copy of today's visit will be forwarded to the referring provider. Per the referral note, she is currently using Lomotil to manage her diarrhea. Labs 11/18/2022:Hemoglobin A1c 13.1, cholesterol 200, , glucose 330, BUN 31, creatinine 1.81, GFR 32, alkaline phosphatase 125 otherwise normal LFTs, hemoglobin 11.4, hematocrit 33, normal MCV. Colonoscopy 6/8/2018:Performed with difficulty due to restricted mobility of the colon, significant looping and a tortuous colon.  Quality of bowel prep was good.  TI was normal.  Diverticulosis in the sigmoid colon.  No specimens collected.  Repeat colonoscopy in 10 years for screening purposes.  Due in June 2028.  She has had intermittent diarrhea ongoing for years. She states this flared after having Covid in 2021 then again in 2023. She was requiring adult diapers due to incontinence. She has had fairly normal bowel movements for the last 2-4 weeks. She is concerned for future flares. SHe has Lomotil pending exacerbations. She has had marked abdominal bloating for some time now. Denies abdominal pain.   She is no longer on Metformin. She is no longer taking Pantoprazole. She is currently managing her reflux with Rabeprazole. She has run out of the medication. She has occasional nausea without vomiting. Denies melena or hematemesis.  Interval history, 5/16/2023: 62 year old female presents for follow up. She was last seen on 4/5/2023. She was trialed on course of Xifaxan for possible IBS. If no improvement, we woul consider a colonoscopy. Due to abdominal bloating, an ultrasound was planned.   Abdominal ultrasound4/17/2023: No evidence of midline anterior abdominal wall hernia or other abnormalities.  Labs 5/10/2023: Hgb 8.2, Hct 24.7, normal MCVglucose 296.7, BUN 30, creatinine 1.79, normal LFTs, GFR 32.  Patient was brought to EMS on 5/9/2023 for hyperglycemia. Hgb A1c was 13.4. Chest x-ray reveled patchy bilateral airspace opacities most pronounced within the lower loves can be seen with pulmonary edema or multifocal  infectious.inflammatory process. Kidney ultrasound revealed no evidence for obstructive uropathy. There was debris within the urniary bladder. This is nonspecific but can be seen with infection. Patient was admitted for sepsis (?)  She states her bloating has improved. She does still have intermittent diarrhea. SHe did not finish her Xifaxan prior to be hospitalized. She also did not trial the benefiber. She states her sugars are under better control. She has changed her diet. She does admit to more cold intolerance as of late. SHe has not had her hgb checked. Denies blood per rectum. Interval history, 7/17/2023.  Referring physician laboratory testing was reviewed.  CMP revealed a creatinine of 1.8.  Glucose 122 otherwise normal CMP.  Hemoglobin A1c was markedly elevated at 13.3.  B12 level was normal.  Ferritin was 332. The patient states over the last 2 weeks she has noted that she is having a lot more belching and early satiety.  She feels that the rabeprazole and famotidine are not working at this time.  She was recently started on prednisone for an asthma attack.

## 2023-12-05 ENCOUNTER — TELEPHONE ENCOUNTER (OUTPATIENT)
Dept: URBAN - METROPOLITAN AREA CLINIC 113 | Facility: CLINIC | Age: 63
End: 2023-12-05

## 2023-12-05 RX ORDER — FAMOTIDINE 40 MG/1
1 TABLET AT BEDTIME TABLET, FILM COATED ORAL ONCE A DAY
Qty: 90 TABLET | Refills: 1

## 2024-02-08 ENCOUNTER — OV EP (OUTPATIENT)
Dept: URBAN - METROPOLITAN AREA CLINIC 113 | Facility: CLINIC | Age: 64
End: 2024-02-08
Payer: COMMERCIAL

## 2024-02-08 ENCOUNTER — LAB (OUTPATIENT)
Dept: URBAN - METROPOLITAN AREA CLINIC 113 | Facility: CLINIC | Age: 64
End: 2024-02-08

## 2024-02-08 VITALS
BODY MASS INDEX: 30.91 KG/M2 | SYSTOLIC BLOOD PRESSURE: 133 MMHG | WEIGHT: 168 LBS | DIASTOLIC BLOOD PRESSURE: 70 MMHG | HEIGHT: 62 IN | HEART RATE: 66 BPM | RESPIRATION RATE: 18 BRPM | TEMPERATURE: 97.8 F

## 2024-02-08 DIAGNOSIS — K25.9 GASTRIC ULCER: ICD-10-CM

## 2024-02-08 DIAGNOSIS — E53.8 B12 DEFICIENCY: ICD-10-CM

## 2024-02-08 DIAGNOSIS — D64.9 NORMOCYTIC ANEMIA: ICD-10-CM

## 2024-02-08 DIAGNOSIS — K58.0 IRRITABLE BOWEL SYNDROME WITH DIARRHEA: ICD-10-CM

## 2024-02-08 PROCEDURE — 99214 OFFICE O/P EST MOD 30 MIN: CPT | Performed by: INTERNAL MEDICINE

## 2024-02-08 RX ORDER — ASPIRIN 81 MG/1
TAKE 1 TABLET DAILY TABLET ORAL
Refills: 0 | Status: ON HOLD | COMMUNITY
Start: 2018-04-04

## 2024-02-08 RX ORDER — PANTOPRAZOLE SODIUM 40 MG/1
1 TABLET TABLET, DELAYED RELEASE ORAL TWICE DAILY
Qty: 180 | Refills: 4 | Status: ON HOLD | COMMUNITY
Start: 2021-01-14

## 2024-02-08 RX ORDER — HYDROCHLOROTHIAZIDE 25 MG/1
TAKE 1 TABLET DAILY TABLET ORAL
Refills: 0 | Status: ACTIVE | COMMUNITY
Start: 2018-04-04

## 2024-02-08 RX ORDER — RABEPRAZOLE SODIUM 20 MG/1
1 TABLET TABLET, DELAYED RELEASE ORAL ONCE A DAY
Qty: 90 | Refills: 3 | Status: ACTIVE | COMMUNITY

## 2024-02-08 RX ORDER — METFORMIN HYDROCHLORIDE 1000 MG/1
TAKE 1 TABLET TWICE DAILY WITH MEALS TABLET, COATED ORAL
Refills: 0 | Status: ON HOLD | COMMUNITY
Start: 2018-04-04

## 2024-02-08 RX ORDER — FAMOTIDINE 40 MG/1
1 TABLET AT BEDTIME TABLET, FILM COATED ORAL ONCE A DAY
Qty: 90 TABLET | Refills: 1 | Status: ACTIVE | COMMUNITY

## 2024-02-08 RX ORDER — FUROSEMIDE 40 MG/1
1 TABLET TABLET ORAL ONCE A DAY
Status: ACTIVE | COMMUNITY

## 2024-02-08 RX ORDER — ATORVASTATIN CALCIUM 20 MG/1
1 TABLET TABLET, FILM COATED ORAL ONCE A DAY
Status: ON HOLD | COMMUNITY

## 2024-02-08 RX ORDER — LOSARTAN POTASSIUM 100 MG/1
1 TABLET TABLET ORAL ONCE A DAY
Status: ACTIVE | COMMUNITY

## 2024-02-08 RX ORDER — FLUTICASONE PROPIONATE 50 UG/1
USE 1 SPRAY IN EACH NOSTRIL ONCE DAILY SPRAY, METERED NASAL
Refills: 0 | Status: ACTIVE | COMMUNITY
Start: 2018-04-04

## 2024-02-08 RX ORDER — LOSARTAN POTASSIUM 100 MG/1
TAKE 1 TABLET DAILY TABLET, FILM COATED ORAL
Refills: 0 | Status: ON HOLD | COMMUNITY
Start: 2018-04-04

## 2024-02-08 RX ORDER — CETIRIZINE HYDROCHLORIDE 10 MG/1
TAKE 1 TABLET DAILY AS NEEDED TABLET, FILM COATED ORAL
Refills: 0 | Status: ON HOLD | COMMUNITY
Start: 2018-04-04

## 2024-02-08 RX ORDER — QUINAPRIL AND HYDROCHLOROTHIAZIDE 12.5; 1 MG/1; MG/1
TAKE 1 TABLET ONCE DAILY TABLET, FILM COATED ORAL
Refills: 0 | Status: ON HOLD | COMMUNITY
Start: 2018-04-04

## 2024-02-08 RX ORDER — VITAMIN A 2400 MCG
1 TABLET CAPSULE ORAL ONCE A DAY
Qty: 30 | Status: ACTIVE | COMMUNITY
Start: 2021-01-14

## 2024-02-08 RX ORDER — RABEPRAZOLE SODIUM 20 MG/1
1 TABLET TABLET, DELAYED RELEASE ORAL ONCE A DAY
Qty: 30 TABLET | Refills: 6 | Status: ACTIVE | COMMUNITY
Start: 2021-02-09

## 2024-02-08 RX ORDER — GLIPIZIDE 10 MG/1
TAKE 1 TABLET DAILY TABLET ORAL
Refills: 0 | Status: ON HOLD | COMMUNITY
Start: 2018-04-04

## 2024-02-08 RX ORDER — PROMETHAZINE HYDROCHLORIDE 25 MG/1
1 TABLET AS NEEDED TABLET ORAL
Status: ACTIVE | COMMUNITY

## 2024-02-08 RX ORDER — MONTELUKAST 10 MG/1
1 TABLET TABLET, FILM COATED ORAL ONCE A DAY
Status: ACTIVE | COMMUNITY

## 2024-02-08 RX ORDER — AMLODIPINE BESYLATE 10 MG/1
TAKE 1 TABLET DAILY TABLET ORAL
Refills: 0 | Status: ACTIVE | COMMUNITY
Start: 2018-04-04

## 2024-02-08 RX ORDER — INSULIN ASPART 100 [IU]/ML
INJECT 10 UNIT TWICE DAILY INJECTION, SUSPENSION SUBCUTANEOUS
Refills: 0 | Status: ON HOLD | COMMUNITY
Start: 2018-04-04

## 2024-02-08 NOTE — HPI-TODAY'S VISIT:
60-year-old woman, patient of Dr. Art, presenting for follow-up after hospitalization for upper GI bleeding, likely due to nonsteroidal induced injury.  She was seen in consultation by Dr. Adalberto Lua on 12/13/2020.  Hemoglobin on admission was 5.5, hematocrit 17.5, platelets 333.  She was transfused 2 units of  packed red blood cells with hemoglobin increasing to 8.8  Following transfusion.  Her baseline hemoglobin is approximately around 9.  An EGD was performed 12/14/2020.  Findings included a normal esophagus, nonbleeding gastric ulcer without stigmata of bleeding, chronic gastritis status post biopsies, gastric erosions without bleeding, normal duodenum.  Pathology demonstrated no H. pylori.  She was discharged on  12/15/2020 on pantoprazole 40 mg twice daily, ferrous sulfate 324 mg  twice daily.  Repeat EGD is recommended in 8 weeks to ensure ulcer healing.  She called the office stating that she was experiencing diarrhea. She was instructed to stop Cipro, and planned for stools for C. diff. This was negative. She has been taking PPI BID. No NSAIDs.  Patient is without any abdominal complaints today. No dysphagia, heartburn, regurgitation, unintentional weight loss, nausea, vomiting, hematemesis or melena. Bowels are moving regularly without blood per rectum. No complaints of bloating. No jaundice, icterus.  Interval history, 4/5/2023 62-year-old female with a history of COPD, chronic kidney disease stage IV, type 2 diabetes, hypertension, GERD presents for long interval follow-up.  She was last seen on 1/14/2021.  She was recently hospitalized for melena and acute blood loss anemia secondary to gastric ulcer.  She was treated with 2 units packed PRBC.  She was to continue twice daily PPI therapy with plans for repeat EGD to assess for ulcer healing.  She was to continue NSAID avoidance, and we would repeat hemoglobin to trend. Pantoprazole caused severe diarrhea.  This was switched to rabeprazole.  EGD scheduled for 2/19/2021 was canceled due to COVID.  She has been lost to follow-up ever since. She has been referred back today by Dr. Hipolito Hernandez for irritable bowel syndrome with diarrhea.  A copy of today's visit will be forwarded to the referring provider. Per the referral note, she is currently using Lomotil to manage her diarrhea. Labs 11/18/2022:Hemoglobin A1c 13.1, cholesterol 200, , glucose 330, BUN 31, creatinine 1.81, GFR 32, alkaline phosphatase 125 otherwise normal LFTs, hemoglobin 11.4, hematocrit 33, normal MCV. Colonoscopy 6/8/2018:Performed with difficulty due to restricted mobility of the colon, significant looping and a tortuous colon.  Quality of bowel prep was good.  TI was normal.  Diverticulosis in the sigmoid colon.  No specimens collected.  Repeat colonoscopy in 10 years for screening purposes.  Due in June 2028.  She has had intermittent diarrhea ongoing for years. She states this flared after having Covid in 2021 then again in 2023. She was requiring adult diapers due to incontinence. She has had fairly normal bowel movements for the last 2-4 weeks. She is concerned for future flares. SHe has Lomotil pending exacerbations. She has had marked abdominal bloating for some time now. Denies abdominal pain.   She is no longer on Metformin. She is no longer taking Pantoprazole. She is currently managing her reflux with Rabeprazole. She has run out of the medication. She has occasional nausea without vomiting. Denies melena or hematemesis.  Interval history, 5/16/2023: 62 year old female presents for follow up. She was last seen on 4/5/2023. She was trialed on course of Xifaxan for possible IBS. If no improvement, we woul consider a colonoscopy. Due to abdominal bloating, an ultrasound was planned.   Abdominal ultrasound4/17/2023: No evidence of midline anterior abdominal wall hernia or other abnormalities.  Labs 5/10/2023: Hgb 8.2, Hct 24.7, normal MCVglucose 296.7, BUN 30, creatinine 1.79, normal LFTs, GFR 32.  Patient was brought to EMS on 5/9/2023 for hyperglycemia. Hgb A1c was 13.4. Chest x-ray reveled patchy bilateral airspace opacities most pronounced within the lower loves can be seen with pulmonary edema or multifocal  infectious.inflammatory process. Kidney ultrasound revealed no evidence for obstructive uropathy. There was debris within the urniary bladder. This is nonspecific but can be seen with infection. Patient was admitted for sepsis (?)  She states her bloating has improved. She does still have intermittent diarrhea. SHe did not finish her Xifaxan prior to be hospitalized. She also did not trial the benefiber. She states her sugars are under better control. She has changed her diet. She does admit to more cold intolerance as of late. SHe has not had her hgb checked. Denies blood per rectum. Interval history, 7/17/2023.  Referring physician laboratory testing was reviewed.  CMP revealed a creatinine of 1.8.  Glucose 122 otherwise normal CMP.  Hemoglobin A1c was markedly elevated at 13.3.  B12 level was normal.  Ferritin was 332. The patient states over the last 2 weeks she has noted that she is having a lot more belching and early satiety.  She feels that the rabeprazole and famotidine are not working at this time.  She was recently started on prednisone for an asthma attack. Interval history, 2/8/2024.  Referring records were reviewed.  Laboratory testing from August 2023 revealed a CBC showing normal white cell count, hemoglobin had increased to 10.2.  MCV was normal at 80.  Platelet count was normal at 303.  Hemoglobin A1c was markedly elevated at 12.  CMP revealed a BUN 48 creatinine 1.9 glucose 140 otherwise normal CMP. The patient states she is here today because of a new anemia.  She has no gastrointestinal complaints.  She denies any nonsteroidal anti-inflammatory agents.  She states she remains on a proton pump inhibitor.  There is no melena or hematochezia.

## 2024-02-08 NOTE — EXAM-PHYSICAL EXAM
She is alert and oriented to person place and situation no acute distress.  There is no scleral icterus.  She is slightly pale in appearance.

## 2024-02-09 LAB
ABSOLUTE BASOPHILS: 32
ABSOLUTE EOSINOPHILS: 111
ABSOLUTE LYMPHOCYTES: 1807
ABSOLUTE MONOCYTES: 493
ABSOLUTE NEUTROPHILS: 2857
BASOPHILS: 0.6
C-REACTIVE PROTEIN, QUANT: 16.8
EOSINOPHILS: 2.1
FERRITIN, SERUM: 169
FOLATE (FOLIC ACID), SERUM: 20.7
HEMATOCRIT: 31.4
HEMOGLOBIN: 10.2
IRON BIND.CAP.(TIBC): 250
IRON SATURATION: 29
IRON: 73
LD: 181
LYMPHOCYTES: 34.1
MCH: 27.3
MCHC: 32.5
MCV: 84.2
MONOCYTES: 9.3
MPV: 10.6
NEUTROPHILS: 53.9
PLATELET COUNT: 322
RDW: 12.3
RED BLOOD CELL COUNT: 3.73
SED RATE BY MODIFIED: 38
VITAMIN B12: 950
WHITE BLOOD CELL COUNT: 5.3

## 2024-02-27 ENCOUNTER — OV EP (OUTPATIENT)
Dept: URBAN - METROPOLITAN AREA CLINIC 113 | Facility: CLINIC | Age: 64
End: 2024-02-27

## 2024-03-25 ENCOUNTER — EGD (OUTPATIENT)
Dept: URBAN - METROPOLITAN AREA SURGERY CENTER 25 | Facility: SURGERY CENTER | Age: 64
End: 2024-03-25

## 2024-03-25 RX ORDER — FAMOTIDINE 40 MG/1
1 TABLET AT BEDTIME TABLET, FILM COATED ORAL ONCE A DAY
Qty: 90 TABLET | Refills: 1 | Status: ACTIVE | COMMUNITY

## 2024-03-25 RX ORDER — RABEPRAZOLE SODIUM 20 MG/1
1 TABLET TABLET, DELAYED RELEASE ORAL ONCE A DAY
Qty: 90 | Refills: 3 | Status: ACTIVE | COMMUNITY

## 2024-03-25 RX ORDER — INSULIN ASPART 100 [IU]/ML
INJECT 10 UNIT TWICE DAILY INJECTION, SUSPENSION SUBCUTANEOUS
Refills: 0 | Status: ON HOLD | COMMUNITY
Start: 2018-04-04

## 2024-03-25 RX ORDER — METFORMIN HYDROCHLORIDE 1000 MG/1
TAKE 1 TABLET TWICE DAILY WITH MEALS TABLET, COATED ORAL
Refills: 0 | Status: ON HOLD | COMMUNITY
Start: 2018-04-04

## 2024-03-25 RX ORDER — GLIPIZIDE 10 MG/1
TAKE 1 TABLET DAILY TABLET ORAL
Refills: 0 | Status: ON HOLD | COMMUNITY
Start: 2018-04-04

## 2024-03-25 RX ORDER — VITAMIN A 2400 MCG
1 TABLET CAPSULE ORAL ONCE A DAY
Qty: 30 | Status: ACTIVE | COMMUNITY
Start: 2021-01-14

## 2024-03-25 RX ORDER — MONTELUKAST 10 MG/1
1 TABLET TABLET, FILM COATED ORAL ONCE A DAY
Status: ACTIVE | COMMUNITY

## 2024-03-25 RX ORDER — FLUTICASONE PROPIONATE 50 UG/1
USE 1 SPRAY IN EACH NOSTRIL ONCE DAILY SPRAY, METERED NASAL
Refills: 0 | Status: ACTIVE | COMMUNITY
Start: 2018-04-04

## 2024-03-25 RX ORDER — RABEPRAZOLE SODIUM 20 MG/1
1 TABLET TABLET, DELAYED RELEASE ORAL ONCE A DAY
Qty: 30 TABLET | Refills: 6 | Status: ACTIVE | COMMUNITY
Start: 2021-02-09

## 2024-03-25 RX ORDER — LOSARTAN POTASSIUM 100 MG/1
TAKE 1 TABLET DAILY TABLET, FILM COATED ORAL
Refills: 0 | Status: ON HOLD | COMMUNITY
Start: 2018-04-04

## 2024-03-25 RX ORDER — CETIRIZINE HYDROCHLORIDE 10 MG/1
TAKE 1 TABLET DAILY AS NEEDED TABLET, FILM COATED ORAL
Refills: 0 | Status: ON HOLD | COMMUNITY
Start: 2018-04-04

## 2024-03-25 RX ORDER — QUINAPRIL AND HYDROCHLOROTHIAZIDE 12.5; 1 MG/1; MG/1
TAKE 1 TABLET ONCE DAILY TABLET, FILM COATED ORAL
Refills: 0 | Status: ON HOLD | COMMUNITY
Start: 2018-04-04

## 2024-03-25 RX ORDER — LOSARTAN POTASSIUM 100 MG/1
1 TABLET TABLET ORAL ONCE A DAY
Status: ACTIVE | COMMUNITY

## 2024-03-25 RX ORDER — ATORVASTATIN CALCIUM 20 MG/1
1 TABLET TABLET, FILM COATED ORAL ONCE A DAY
Status: ON HOLD | COMMUNITY

## 2024-03-25 RX ORDER — PANTOPRAZOLE SODIUM 40 MG/1
1 TABLET TABLET, DELAYED RELEASE ORAL TWICE DAILY
Qty: 180 | Refills: 4 | Status: ON HOLD | COMMUNITY
Start: 2021-01-14

## 2024-03-25 RX ORDER — ASPIRIN 81 MG/1
TAKE 1 TABLET DAILY TABLET ORAL
Refills: 0 | Status: ON HOLD | COMMUNITY
Start: 2018-04-04

## 2024-03-25 RX ORDER — HYDROCHLOROTHIAZIDE 25 MG/1
TAKE 1 TABLET DAILY TABLET ORAL
Refills: 0 | Status: ACTIVE | COMMUNITY
Start: 2018-04-04

## 2024-03-25 RX ORDER — FUROSEMIDE 40 MG/1
1 TABLET TABLET ORAL ONCE A DAY
Status: ACTIVE | COMMUNITY

## 2024-03-25 RX ORDER — AMLODIPINE BESYLATE 10 MG/1
TAKE 1 TABLET DAILY TABLET ORAL
Refills: 0 | Status: ACTIVE | COMMUNITY
Start: 2018-04-04

## 2024-03-25 RX ORDER — PROMETHAZINE HYDROCHLORIDE 25 MG/1
1 TABLET AS NEEDED TABLET ORAL
Status: ACTIVE | COMMUNITY

## 2024-04-15 ENCOUNTER — OV EP (OUTPATIENT)
Dept: URBAN - METROPOLITAN AREA CLINIC 113 | Facility: CLINIC | Age: 64
End: 2024-04-15

## 2024-05-06 ENCOUNTER — OFFICE VISIT (OUTPATIENT)
Dept: URBAN - METROPOLITAN AREA CLINIC 113 | Facility: CLINIC | Age: 64
End: 2024-05-06

## 2024-07-01 ENCOUNTER — OFFICE VISIT (OUTPATIENT)
Dept: URBAN - METROPOLITAN AREA CLINIC 113 | Facility: CLINIC | Age: 64
End: 2024-07-01

## 2025-06-09 ENCOUNTER — OFFICE VISIT (OUTPATIENT)
Dept: URBAN - METROPOLITAN AREA CLINIC 113 | Facility: CLINIC | Age: 65
End: 2025-06-09

## 2025-07-11 ENCOUNTER — OFFICE VISIT (OUTPATIENT)
Dept: URBAN - METROPOLITAN AREA CLINIC 113 | Facility: CLINIC | Age: 65
End: 2025-07-11

## 2025-08-21 ENCOUNTER — DASHBOARD ENCOUNTERS (OUTPATIENT)
Age: 65
End: 2025-08-21

## 2025-08-21 ENCOUNTER — OFFICE VISIT (OUTPATIENT)
Dept: URBAN - METROPOLITAN AREA CLINIC 113 | Facility: CLINIC | Age: 65
End: 2025-08-21
Payer: COMMERCIAL

## 2025-08-21 DIAGNOSIS — A04.8 H. PYLORI INFECTION: ICD-10-CM

## 2025-08-21 DIAGNOSIS — R14.0 ABDOMINAL BLOATING: ICD-10-CM

## 2025-08-21 DIAGNOSIS — D62 ACUTE POSTHEMORRHAGIC ANEMIA: ICD-10-CM

## 2025-08-21 DIAGNOSIS — K21.9 GERD WITHOUT ESOPHAGITIS: ICD-10-CM

## 2025-08-21 DIAGNOSIS — K25.9 GASTRIC ULCER, UNSPECIFIED AS ACUTE OR CHRONIC, WITHOUT HEMORRHAGE OR PERFORATION: ICD-10-CM

## 2025-08-21 DIAGNOSIS — K58.0 IRRITABLE BOWEL SYNDROME WITH DIARRHEA: ICD-10-CM

## 2025-08-21 PROCEDURE — 99214 OFFICE O/P EST MOD 30 MIN: CPT | Performed by: INTERNAL MEDICINE

## 2025-08-21 RX ORDER — ASPIRIN 81 MG/1
TAKE 1 TABLET DAILY TABLET ORAL
Refills: 0 | Status: ON HOLD | COMMUNITY
Start: 2018-04-04

## 2025-08-21 RX ORDER — QUINAPRIL AND HYDROCHLOROTHIAZIDE 12.5; 1 MG/1; MG/1
TAKE 1 TABLET ONCE DAILY TABLET, FILM COATED ORAL
Refills: 0 | Status: ON HOLD | COMMUNITY
Start: 2018-04-04

## 2025-08-21 RX ORDER — BISMUTH SUBSALICYLATE 262 MG/1
2 TABLETS TABLET, CHEWABLE ORAL
Qty: 112 TABLET | Refills: 0 | OUTPATIENT
Start: 2025-08-21 | End: 2025-09-04

## 2025-08-21 RX ORDER — MONTELUKAST 10 MG/1
1 TABLET TABLET, FILM COATED ORAL ONCE A DAY
Status: ACTIVE | COMMUNITY

## 2025-08-21 RX ORDER — OMEPRAZOLE 40 MG/1
1 CAPSULE 30 MINUTES BEFORE MORNING MEAL CAPSULE, DELAYED RELEASE ORAL TWICE A DAY
Qty: 28 | Refills: 0 | OUTPATIENT
Start: 2025-08-21

## 2025-08-21 RX ORDER — RABEPRAZOLE SODIUM 20 MG/1
1 TABLET TABLET, DELAYED RELEASE ORAL ONCE A DAY
Qty: 90 | Refills: 3 | Status: ACTIVE | COMMUNITY

## 2025-08-21 RX ORDER — LOSARTAN POTASSIUM 100 MG/1
1 TABLET TABLET ORAL ONCE A DAY
Status: ACTIVE | COMMUNITY

## 2025-08-21 RX ORDER — HYDROCHLOROTHIAZIDE 25 MG/1
TAKE 1 TABLET DAILY TABLET ORAL
Refills: 0 | Status: ACTIVE | COMMUNITY
Start: 2018-04-04

## 2025-08-21 RX ORDER — CETIRIZINE HYDROCHLORIDE 10 MG/1
TAKE 1 TABLET DAILY AS NEEDED TABLET, FILM COATED ORAL
Refills: 0 | Status: ON HOLD | COMMUNITY
Start: 2018-04-04

## 2025-08-21 RX ORDER — PANTOPRAZOLE SODIUM 40 MG/1
1 TABLET TABLET, DELAYED RELEASE ORAL TWICE DAILY
Qty: 180 | Refills: 4 | Status: ON HOLD | COMMUNITY
Start: 2021-01-14

## 2025-08-21 RX ORDER — RABEPRAZOLE SODIUM 20 MG/1
1 TABLET TABLET, DELAYED RELEASE ORAL ONCE A DAY
Qty: 30 TABLET | Refills: 6 | Status: ACTIVE | COMMUNITY
Start: 2021-02-09

## 2025-08-21 RX ORDER — VITAMIN A 2400 MCG
1 TABLET CAPSULE ORAL ONCE A DAY
Qty: 30 | Status: ACTIVE | COMMUNITY
Start: 2021-01-14

## 2025-08-21 RX ORDER — TETRACYCLINE HYDROCHLORIDE 500 MG/1
1 CAPSULE ON AN EMPTY STOMACH CAPSULE ORAL
Qty: 56 CAPSULE | Refills: 0 | OUTPATIENT
Start: 2025-08-21 | End: 2025-09-04

## 2025-08-21 RX ORDER — FLUTICASONE PROPIONATE 50 UG/1
USE 1 SPRAY IN EACH NOSTRIL ONCE DAILY SPRAY, METERED NASAL
Refills: 0 | Status: ACTIVE | COMMUNITY
Start: 2018-04-04

## 2025-08-21 RX ORDER — FAMOTIDINE 40 MG/1
1 TABLET AT BEDTIME TABLET, FILM COATED ORAL ONCE A DAY
Qty: 90 TABLET | Refills: 1 | Status: ACTIVE | COMMUNITY

## 2025-08-21 RX ORDER — INSULIN ASPART 100 [IU]/ML
INJECT 10 UNIT TWICE DAILY INJECTION, SUSPENSION SUBCUTANEOUS
Refills: 0 | Status: ON HOLD | COMMUNITY
Start: 2018-04-04

## 2025-08-21 RX ORDER — PROMETHAZINE HYDROCHLORIDE 25 MG/1
1 TABLET AS NEEDED TABLET ORAL
Status: ACTIVE | COMMUNITY

## 2025-08-21 RX ORDER — METRONIDAZOLE 250 MG/1
1 TABLET TABLET ORAL
Qty: 56 TABLET | Refills: 0 | OUTPATIENT
Start: 2025-08-21 | End: 2025-09-04

## 2025-08-21 RX ORDER — METFORMIN HYDROCHLORIDE 1000 MG/1
TAKE 1 TABLET TWICE DAILY WITH MEALS TABLET, COATED ORAL
Refills: 0 | Status: ON HOLD | COMMUNITY
Start: 2018-04-04

## 2025-08-21 RX ORDER — LOSARTAN POTASSIUM 100 MG/1
TAKE 1 TABLET DAILY TABLET, FILM COATED ORAL
Refills: 0 | Status: ON HOLD | COMMUNITY
Start: 2018-04-04

## 2025-08-21 RX ORDER — GLIPIZIDE 10 MG/1
TAKE 1 TABLET DAILY TABLET ORAL
Refills: 0 | Status: ON HOLD | COMMUNITY
Start: 2018-04-04

## 2025-08-21 RX ORDER — AMLODIPINE BESYLATE 10 MG/1
TAKE 1 TABLET DAILY TABLET ORAL
Refills: 0 | Status: ACTIVE | COMMUNITY
Start: 2018-04-04

## 2025-08-21 RX ORDER — FUROSEMIDE 40 MG/1
1 TABLET TABLET ORAL ONCE A DAY
Status: ACTIVE | COMMUNITY

## 2025-08-21 RX ORDER — ATORVASTATIN CALCIUM 20 MG/1
1 TABLET TABLET, FILM COATED ORAL ONCE A DAY
Status: ON HOLD | COMMUNITY

## 2025-08-21 RX ORDER — OMEPRAZOLE 40 MG/1
1 CAPSULE 30 MINUTES BEFORE MORNING MEAL CAPSULE, DELAYED RELEASE ORAL ONCE A DAY
Qty: 90 | Refills: 3 | OUTPATIENT
Start: 2025-08-21

## 2025-08-29 ENCOUNTER — TELEPHONE ENCOUNTER (OUTPATIENT)
Dept: URBAN - METROPOLITAN AREA CLINIC 113 | Facility: CLINIC | Age: 65
End: 2025-08-29